# Patient Record
Sex: FEMALE | Race: WHITE | NOT HISPANIC OR LATINO | Employment: FULL TIME | URBAN - METROPOLITAN AREA
[De-identification: names, ages, dates, MRNs, and addresses within clinical notes are randomized per-mention and may not be internally consistent; named-entity substitution may affect disease eponyms.]

---

## 2021-05-28 ENCOUNTER — RECORDS - HEALTHEAST (OUTPATIENT)
Dept: ADMINISTRATIVE | Facility: CLINIC | Age: 36
End: 2021-05-28

## 2023-11-18 ENCOUNTER — HOSPITAL ENCOUNTER (EMERGENCY)
Facility: CLINIC | Age: 38
Discharge: HOME | End: 2023-11-18
Attending: EMERGENCY MEDICINE | Admitting: EMERGENCY MEDICINE
Payer: COMMERCIAL

## 2023-11-18 VITALS
DIASTOLIC BLOOD PRESSURE: 66 MMHG | OXYGEN SATURATION: 98 % | HEART RATE: 83 BPM | TEMPERATURE: 99.1 F | BODY MASS INDEX: 22.3 KG/M2 | HEIGHT: 64 IN | RESPIRATION RATE: 20 BRPM | SYSTOLIC BLOOD PRESSURE: 93 MMHG | WEIGHT: 130.6 LBS

## 2023-11-18 DIAGNOSIS — B02.9 HERPES ZOSTER WITHOUT COMPLICATION: ICD-10-CM

## 2023-11-18 PROCEDURE — 99283 EMERGENCY DEPT VISIT LOW MDM: CPT | Performed by: EMERGENCY MEDICINE

## 2023-11-18 PROCEDURE — 250N000013 HC RX MED GY IP 250 OP 250 PS 637: Performed by: EMERGENCY MEDICINE

## 2023-11-18 PROCEDURE — 99283 EMERGENCY DEPT VISIT LOW MDM: CPT

## 2023-11-18 RX ORDER — VALACYCLOVIR HYDROCHLORIDE 500 MG/1
1000 TABLET, FILM COATED ORAL ONCE
Status: COMPLETED | OUTPATIENT
Start: 2023-11-18 | End: 2023-11-18

## 2023-11-18 RX ORDER — VALACYCLOVIR HYDROCHLORIDE 1 G/1
1000 TABLET, FILM COATED ORAL 3 TIMES DAILY
Qty: 21 TABLET | Refills: 0 | Status: SHIPPED | OUTPATIENT
Start: 2023-11-18 | End: 2023-11-25

## 2023-11-18 RX ADMIN — VALACYCLOVIR HYDROCHLORIDE 1000 MG: 500 TABLET, FILM COATED ORAL at 20:15

## 2023-11-19 NOTE — ED TRIAGE NOTES
"Pt states she was seen in clinic on Wednesday for \"a lump\" on the back of her neck and a rash on the left side of her neck. She states that the provider stated the lump was her lymph nodes and prescribed abx. Rash started getting much worse yesterday and has spread to left ear and down left side of neck to her collarbone.      Triage Assessment (Adult)       Row Name 11/18/23 1918          Triage Assessment    Airway WDL WDL        Respiratory WDL    Respiratory WDL WDL        Skin Circulation/Temperature WDL    Skin Circulation/Temperature WDL WDL        Cardiac WDL    Cardiac WDL WDL        Peripheral/Neurovascular WDL    Peripheral Neurovascular WDL WDL        Cognitive/Neuro/Behavioral WDL    Cognitive/Neuro/Behavioral WDL WDL                     "

## 2023-11-19 NOTE — DISCHARGE INSTRUCTIONS
Follow-up with your primary clinic as needed.    Valacyclovir as directed.    Return to the emergency department for eye pain or any other problems.

## 2023-11-20 NOTE — ED PROVIDER NOTES
"  History     Chief Complaint   Patient presents with    Rash     HPI  Olamide Lee is a 38 year old female who presents to the emergency department for rash on the left side of her face and neck.  She was seen a few days ago in the clinic with lymphadenopathy in the left side of her neck in the posterior auricular area and was started on antibiotics for concern of infection.  She has subsequently broken out in a painful rash on her left ear, left side of her neck and left lower mandibular area.  She denies fevers, loss of hearing, dizziness, eye pain or discharge or other complaints.    Allergies:  No Known Allergies    Problem List:    There are no problems to display for this patient.       Past Medical History:    No past medical history on file.    Past Surgical History:    No past surgical history on file.    Family History:    No family history on file.    Social History:  Marital Status:  Single [1]        Medications:    valACYclovir (VALTREX) 1000 mg tablet          Review of Systems    Physical Exam   BP: 93/66  Pulse: 83  Temp: 99.1  F (37.3  C)  Resp: 20  Height: 161.9 cm (5' 3.75\")  Weight: 59.2 kg (130 lb 9.6 oz)  SpO2: 98 %      Physical Exam  Vitals and nursing note reviewed.   Constitutional:       General: She is not in acute distress.     Appearance: She is well-developed. She is not ill-appearing or toxic-appearing.   HENT:      Head: Normocephalic and atraumatic.   Eyes:      General: No scleral icterus.     Pupils: Pupils are equal, round, and reactive to light.   Cardiovascular:      Rate and Rhythm: Normal rate.   Pulmonary:      Effort: Pulmonary effort is normal. No respiratory distress.   Musculoskeletal:      Cervical back: Normal range of motion and neck supple.   Skin:     General: Skin is warm and dry.      Coloration: Skin is not pale.      Findings: Rash present.      Comments: Patient has a vesicular eruption on erythematous base noted on the left pinna, left side of her neck " and lower, distal mandibular area on the left near the chin.  This seems consistent with herpes zoster, but does not seem to follow a trigeminal nerve root distribution.   Neurological:      Mental Status: She is alert and oriented to person, place, and time.      Sensory: No sensory deficit.   Psychiatric:         Behavior: Behavior normal.         ED Course                 Procedures                  No results found for this or any previous visit (from the past 24 hour(s)).    Medications   valACYclovir (VALTREX) tablet 1,000 mg (1,000 mg Oral $Given 11/18/23 2015)       Assessments & Plan (with Medical Decision Making)     I have reviewed the nursing notes.    I have reviewed the findings, diagnosis, plan and need for follow up with the patient.  This patient presented to the emergency department with a rash consistent with herpes zoster that seems to be in a cervical nerve root distribution on the left.  No indication of trigeminal nerve involvement or ocular involvement.  She will be started on valacyclovir and was discharged with instructions for care and follow-up in good condition after receiving a dose in the emergency department.        Discharge Medication List as of 11/18/2023  8:53 PM        START taking these medications    Details   valACYclovir (VALTREX) 1000 mg tablet Take 1 tablet (1,000 mg) by mouth 3 times daily for 7 days, Disp-21 tablet, R-0, E-Prescribe             Final diagnoses:   Herpes zoster without complication       11/18/2023   [facility] EMERGENCY DEPT       Ronald Arteaga MD  11/20/23 2101

## 2024-03-09 ENCOUNTER — HEALTH MAINTENANCE LETTER (OUTPATIENT)
Age: 39
End: 2024-03-09

## 2025-03-16 ENCOUNTER — HEALTH MAINTENANCE LETTER (OUTPATIENT)
Age: 40
End: 2025-03-16